# Patient Record
Sex: FEMALE | Race: WHITE | NOT HISPANIC OR LATINO | Employment: OTHER | URBAN - METROPOLITAN AREA
[De-identification: names, ages, dates, MRNs, and addresses within clinical notes are randomized per-mention and may not be internally consistent; named-entity substitution may affect disease eponyms.]

---

## 2019-10-14 ENCOUNTER — OFFICE VISIT (OUTPATIENT)
Dept: GASTROENTEROLOGY | Facility: CLINIC | Age: 74
End: 2019-10-14

## 2019-10-14 VITALS
HEIGHT: 63 IN | HEART RATE: 87 BPM | DIASTOLIC BLOOD PRESSURE: 80 MMHG | BODY MASS INDEX: 24.31 KG/M2 | OXYGEN SATURATION: 96 % | SYSTOLIC BLOOD PRESSURE: 124 MMHG | WEIGHT: 137.2 LBS | TEMPERATURE: 97.3 F

## 2019-10-14 DIAGNOSIS — K52.9 CHRONIC DIARRHEA: Primary | ICD-10-CM

## 2019-10-14 DIAGNOSIS — Z86.19 HISTORY OF CLOSTRIDIOIDES DIFFICILE COLITIS: ICD-10-CM

## 2019-10-14 DIAGNOSIS — Z79.01 LONG TERM CURRENT USE OF ANTICOAGULANT: ICD-10-CM

## 2019-10-14 PROCEDURE — 99204 OFFICE O/P NEW MOD 45 MIN: CPT | Performed by: NURSE PRACTITIONER

## 2019-10-14 RX ORDER — CLONAZEPAM 1 MG/1
TABLET ORAL
Refills: 0 | COMMUNITY
Start: 2019-08-12

## 2019-10-14 RX ORDER — LANOLIN ALCOHOL/MO/W.PET/CERES
1000 CREAM (GRAM) TOPICAL DAILY
COMMUNITY

## 2019-10-14 RX ORDER — PHENOL 1.4 %
AEROSOL, SPRAY (ML) MUCOUS MEMBRANE NIGHTLY
COMMUNITY

## 2019-10-14 RX ORDER — LEVOTHYROXINE SODIUM 0.1 MG/1
100 TABLET ORAL DAILY
Refills: 2 | COMMUNITY
Start: 2019-07-29

## 2019-10-14 RX ORDER — DICYCLOMINE HYDROCHLORIDE 10 MG/1
CAPSULE ORAL 3 TIMES DAILY PRN
Refills: 1 | COMMUNITY
Start: 2019-07-30

## 2019-10-14 RX ORDER — GLUCOSAMINE/D3/BOSWELLIA SERRA 1500MG-400
TABLET ORAL NIGHTLY
COMMUNITY

## 2019-10-14 RX ORDER — APIXABAN 5 MG/1
5 TABLET, FILM COATED ORAL 2 TIMES DAILY
Refills: 3 | COMMUNITY
Start: 2019-07-29

## 2019-10-14 RX ORDER — MONTELUKAST SODIUM 4 MG/1
TABLET, CHEWABLE ORAL 2 TIMES DAILY PRN
COMMUNITY
Start: 2019-10-08

## 2019-10-14 RX ORDER — POTASSIUM CHLORIDE 20 MEQ/1
40 TABLET, EXTENDED RELEASE ORAL DAILY
Refills: 6 | COMMUNITY
Start: 2019-09-07

## 2019-10-14 RX ORDER — FUROSEMIDE 20 MG/1
TABLET ORAL DAILY
COMMUNITY
Start: 2019-09-27

## 2019-10-14 RX ORDER — TRAZODONE HYDROCHLORIDE 50 MG/1
TABLET ORAL NIGHTLY
COMMUNITY
Start: 2019-09-10

## 2019-10-14 RX ORDER — ATORVASTATIN CALCIUM 20 MG/1
20 TABLET, FILM COATED ORAL DAILY
Refills: 2 | COMMUNITY
Start: 2019-10-08

## 2019-10-14 RX ORDER — DILTIAZEM HYDROCHLORIDE 240 MG/1
240 CAPSULE, EXTENDED RELEASE ORAL DAILY
Refills: 2 | COMMUNITY
Start: 2019-07-29

## 2019-10-14 RX ORDER — PANTOPRAZOLE SODIUM 20 MG/1
TABLET, DELAYED RELEASE ORAL
Refills: 3 | COMMUNITY
Start: 2019-09-07

## 2019-10-14 NOTE — PATIENT INSTRUCTIONS
Low-FODMAP Eating Plan    FODMAPs (fermentable oligosaccharides, disaccharides, monosaccharides, and polyols) are sugars that are hard for some people to digest. A low-FODMAP eating plan may help some people who have bowel (intestinal) diseases to manage their symptoms.  This meal plan can be complicated to follow. Work with a diet and nutrition specialist (dietitian) to make a low-FODMAP eating plan that is right for you. A dietitian can make sure that you get enough nutrition from this diet.  What are tips for following this plan?  Reading food labels  · Check labels for hidden FODMAPs such as:  ? High-fructose syrup.  ? Honey.  ? Agave.  ? Natural fruit flavors.  ? Onion or garlic powder.  · Choose low-FODMAP foods that contain 3-4 grams of fiber per serving.  · Check food labels for serving sizes. Eat only one serving at a time to make sure FODMAP levels stay low.  Meal planning  · Follow a low-FODMAP eating plan for up to 6 weeks, or as told by your health care provider or dietitian.  · To follow the eating plan:  1. Eliminate high-FODMAP foods from your diet completely.  2. Gradually reintroduce high-FODMAP foods into your diet one at a time. Most people should wait a few days after introducing one high-FODMAP food before they introduce the next high-FODMAP food. Your dietitian can recommend how quickly you may reintroduce foods.  3. Keep a daily record of what you eat and drink, and make note of any symptoms that you have after eating.  4. Review your daily record with a dietitian regularly. Your dietitian can help you identify which foods you can eat and which foods you should avoid.  General tips  · Drink enough fluid each day to keep your urine pale yellow.  · Avoid processed foods. These often have added sugar and may be high in FODMAPs.  · Avoid most dairy products, whole grains, and sweeteners.  · Work with a dietitian to make sure you get enough fiber in your diet.  Recommended  "foods  Grains  · Gluten-free grains, such as rice, oats, buckwheat, quinoa, corn, polenta, and millet. Gluten-free pasta, bread, or cereal. Rice noodles. Corn tortillas.  Vegetables  · Eggplant, zucchini, cucumber, peppers, green beans, [place] sprouts, bean sprouts, lettuce, arugula, kale, Swiss chard, spinach, mamta greens, bok yue, summer squash, potato, and tomato. Limited amounts of corn, carrot, and sweet potato. Green parts of scallions.  Fruits  · Bananas, oranges, maddison, limes, blueberries, raspberries, strawberries, grapes, cantaloupe, honeydew melon, kiwi, papaya, passion fruit, and pineapple. Limited amounts of dried cranberries, banana chips, and shredded coconut.  Dairy  · Lactose-free milk, yogurt, and kefir. Lactose-free cottage cheese and ice cream. Non-dairy milks, such as almond, coconut, hemp, and rice milk. Yogurts made of non-dairy milks. Limited amounts of goat cheese, brie, mozzarella, parmesan, swiss, and other hard cheeses.  Meats and other protein foods  · Unseasoned beef, pork, poultry, or fish. Eggs. Sheriff. Tofu (firm) and tempeh. Limited amounts of nuts and seeds, such as almonds, walnuts, brazil nuts, pecans, peanuts, pumpkin seeds, joe seeds, and sunflower seeds.  Fats and oils  · Butter-free spreads. Vegetable oils, such as olive, canola, and sunflower oil.  Seasoning and other foods  · Artificial sweeteners with names that do not end in \"ol\" such as aspartame, saccharine, and stevia. Maple syrup, white table sugar, raw sugar, brown sugar, and molasses. Fresh basil, coriander, parsley, rosemary, and thyme.  Beverages  · Water and mineral water. Sugar-sweetened soft drinks. Small amounts of orange juice or cranberry juice. Black and green tea. Most dry fern. Coffee.  This may not be a complete list of low-FODMAP foods. Talk with your dietitian for more information.  Foods to avoid  Grains  · Wheat, including kamut, durum, and semolina. Barley and bulgur. Couscous. Wheat-based " cereals. Wheat noodles, bread, crackers, and pastries.  Vegetables  · Chicory root, artichoke, asparagus, cabbage, snow peas, sugar snap peas, mushrooms, and cauliflower. Onions, garlic, leeks, and the white part of scallions.  Fruits  · Fresh, dried, and juiced forms of apple, pear, watermelon, peach, plum, cherries, apricots, blackberries, boysenberries, figs, nectarines, and frank. Avocado.  Dairy  · Milk, yogurt, ice cream, and soft cheese. Cream and sour cream. Milk-based sauces. Custard.  Meats and other protein foods  · Fried or fatty meat. Sausage. Cashews and pistachios. Soybeans, baked beans, black beans, chickpeas, kidney beans, wally beans, navy beans, lentils, and split peas.  Seasoning and other foods  · Any sugar-free gum or candy. Foods that contain artificial sweeteners such as sorbitol, mannitol, isomalt, or xylitol. Foods that contain honey, high-fructose corn syrup, or agave. Bouillon, vegetable stock, beef stock, and chicken stock. Garlic and onion powder. Condiments made with onion, such as hummus, chutney, pickles, relish, salad dressing, and salsa. Tomato paste.  Beverages  · Chicory-based drinks. Coffee substitutes. Chamomile tea. Fennel tea. Sweet or fortified fern such as port or joce. Diet soft drinks made with isomalt, mannitol, maltitol, sorbitol, or xylitol. Apple, pear, and frank juice. Juices with high-fructose corn syrup.  This may not be a complete list of high-FODMAP foods. Talk with your dietitian to discuss what dietary choices are best for you.   Summary  · A low-FODMAP eating plan is a short-term diet that eliminates FODMAPs from your diet to help ease symptoms of certain bowel diseases.  · The eating plan usually lasts up to 6 weeks. After that, high-FODMAP foods are restarted gradually, one at a time, so you can find out which may be causing symptoms.  · A low-FODMAP eating plan can be complicated. It is best to work with a dietitian who has experience with this type of  plan.  This information is not intended to replace advice given to you by your health care provider. Make sure you discuss any questions you have with your health care provider.  Document Released: 08/14/2018 Document Revised: 08/14/2018 Document Reviewed: 08/14/2018  [employer] Interactive Patient Education © 2019 [employer]

## 2019-10-14 NOTE — PROGRESS NOTES
"    GASTROENTEROLOGY OUTPATIENT NEW PATIENT NOTE  Patient: MARJORIE DANIELLE : 1945  Date of Service: 10/14/2019  Dear Pia Alvarado APRN   Thank you for your referral of this patient for evaluation of diarrhea   CC: Diarrhea  Assessment/Plan                                             ASSESSMENT & PLANS     Chronic diarrhea likely to be r/t post infectious IBS-D  Hx of Clostridioides difficile colitis Recent cx negative for CDIff  -     Colonoscopy with Dr Koo  -     Start OTC FD guard.  Samples and coupon given  -     Increase Colestipol 2 tabs fr BID to TID since Colestipol give bloating relief.  -     DC Bentyl since no sx improvement  -     Continue Probiotic  -     Obtain colonoscopy report done in  by Dr Koo    Long term current use of anticoagulant (Eliquis) for Afib  · Pt is encouraged to check w/ her cardiologist Dr Vivas to get an OK for holding Eliquis 3 days before colonoscopy. Pt reports that it is ok b/c she recently held Eliquis 3 days before her heart cath last wk.      Follow Up:Return in about 2 months (around 2019).      DISCUSSION: The above plan was delineated in details with patient and all questions and concerns were answered.  Patient is also given contact information.  Patient is to return as scheduled or sooner if new problems arise  Subjective                                                     SUBJECTIVE   History of Present Illness  Ms. Marjorie Danielle is a 73 y.o. female who presents to the clinic today for an evaluation of Diarrhea  Hx of CDiff  (and hospitalized at ) and BM \"has never been the same.\" Usually w/ loose stools.   Diarrhea worsened /2019. Nocturnal and postprandial diarrhea, about 3-5 times a day [place] 6-7. Pt denies dark black stools or bright red blood in the stools, in the toilet bowl, or on the toilet tissue.  Some associated abd pain, but relieved w/ BM.   Last colonoscopy, late  or early  done by Dr" Hayes.  Pt does not remember its findings.  CDiff and stool cx negative  Taking Bentyl 1 BID, Colestipol 2 tabs BID, and daily probiotic w/o sx improvement. Colestipol helps w/ bloating but not w/ diarrhea.  Xifaxan was attempted, but insurance does not cover.   Has Afib on Eliquis.  Heart cath recently for pedal edema and reportedly it was normal.     ROS:Review of Systems   Constitutional: Negative for activity change, appetite change, fatigue, fever and unexpected weight change.   HENT: Negative for hearing loss, trouble swallowing and voice change.    Eyes: Negative for visual disturbance.   Respiratory: Negative for cough, choking, chest tightness and shortness of breath.    Cardiovascular: Negative for chest pain.   Gastrointestinal: Positive for abdominal pain and diarrhea. Negative for abdominal distention, anal bleeding, blood in stool, constipation, nausea, rectal pain and vomiting.   Endocrine: Negative for polydipsia and polyphagia.   Genitourinary: Negative.    Musculoskeletal: Negative for gait problem and joint swelling.   Skin: Negative for color change and rash.   Allergic/Immunologic: Negative for food allergies.   Neurological: Negative for dizziness, seizures and speech difficulty.   Hematological: Negative for adenopathy.   Psychiatric/Behavioral: Negative for confusion.     Subjective   PAST MED HX: Pt  has no past medical history on file.  PAST SURG HX: Pt  has no past surgical history on file.  FAM HX: family history is not on file.  SOC HX: Pt  reports that she quit smoking about 18 years ago. She does not have any smokeless tobacco history on file. She reports that she does not drink alcohol or use drugs.  Objective                                                           OBJECTIVE   Allergy: Pt is allergic to codeine; penicillins; and sulfa antibiotics.  MEDS: •  atorvastatin (LIPITOR) 20 MG tablet, Take 20 mg by mouth Daily., Disp: , Rfl: 2  •  Biotin 00137 MCG tablet, Take  by mouth  Every Night., Disp: , Rfl:   •  Cholecalciferol (VITAMIN D-3) 5000 units tablet, Take  by mouth Daily., Disp: , Rfl:   •  clonazePAM (KlonoPIN) 1 MG tablet, TAKE ONE TABLET BY MOUTH TWO TIMES A DAY AS NEEDED, Disp: , Rfl: 0  •  colestipol (COLESTID) 1 g tablet, 2 (Two) Times a Day As Needed., Disp: , Rfl:   •  dicyclomine (BENTYL) 10 MG capsule, 3 (Three) Times a Day As Needed., Disp: , Rfl: 1  •  DILT- MG 24 hr capsule, Take 240 mg by mouth Daily., Disp: , Rfl: 2  •  ELIQUIS 5 MG tablet tablet, Take 5 mg by mouth 2 (Two) Times a Day., Disp: , Rfl: 3  •  FOLIC ACID PO, Take 800 mcg by mouth Every Night., Disp: , Rfl:   •  furosemide (LASIX) 20 MG tablet, Daily., Disp: , Rfl:   •  Lactobacillus (PROBIOTIC ACIDOPHILUS PO), Take  by mouth Daily., Disp: , Rfl:   •  levothyroxine (SYNTHROID, LEVOTHROID) 100 MCG tablet, Take 100 mcg by mouth Daily., Disp: , Rfl: 2  •  Melatonin 10 MG tablet, Take  by mouth Every Night., Disp: , Rfl:   •  pantoprazole (PROTONIX) 20 MG EC tablet, TAKE ONE TABLET BY MOUTH TWO TIMES A DAY FOR DYSPEPSIA, Disp: , Rfl: 3  •  potassium chloride (K-DUR,KLOR-CON) 20 MEQ CR tablet, Take 40 mEq by mouth Daily., Disp: , Rfl: 6  •  traZODone (DESYREL) 50 MG tablet, Every Night., Disp: , Rfl:   •  vitamin B-12 (CYANOCOBALAMIN) 1000 MCG tablet, Take 1,000 mcg by mouth Daily., Disp: , Rfl:     Per outside records:  Labs on 9/19/19:   Stools cx (including O&P, Campylobacter, Shigatoxin, CDiff) is negative   Blood work 9/17/19:   Cr 1.01 BUN 14 Na 139 K 3.8 Cl 100 Ca 9.2 Mg 1.2    Wt Readings from Last 5 Encounters:   10/14/19 62.2 kg (137 lb 3.2 oz)   body mass index is 24.3 kg/m².,Temp: 97.3 °F (36.3 °C),BP: 124/80,Heart Rate: 87   Physical Exam  General Well developed; well nourished. NAD   ENT Anicteric sclerae. Oral mucosa pink and moist without thrush or lesions    Neck Neck supple; trachea midline. No thyromegaly   Resp CTA. Respiration effort normal  CV RRR. No M/R/G. No lower extremity  edema  GI Abd soft, NT, ND, normal active bowel sounds.  No HSM.  No abd hernia  Musc No clubbing; no cyanosis.  Gait steady  Skin No rash; no lesions; no bruises.  Skin warm to touch.  Psych Oriented to time, place, and person.  Appropriate affect    Thank you kindly for allowing me to be part of this patient’s care.    Pt care team: Kimberly WATSON & DELPHINE Cartagena  10/14/19 8:52 AM  [facility]--Gastroenterology  [phone]    CC: Pia Arauz APRN  [address] / [facility] [address] FAX:[phone]